# Patient Record
Sex: FEMALE | Race: WHITE | Employment: UNEMPLOYED | URBAN - METROPOLITAN AREA
[De-identification: names, ages, dates, MRNs, and addresses within clinical notes are randomized per-mention and may not be internally consistent; named-entity substitution may affect disease eponyms.]

---

## 2017-06-04 ENCOUNTER — HOSPITAL ENCOUNTER (EMERGENCY)
Facility: CLINIC | Age: 62
Discharge: HOME | End: 2017-06-04
Attending: FAMILY MEDICINE | Admitting: FAMILY MEDICINE
Payer: COMMERCIAL

## 2017-06-04 VITALS
DIASTOLIC BLOOD PRESSURE: 89 MMHG | TEMPERATURE: 97 F | RESPIRATION RATE: 18 BRPM | OXYGEN SATURATION: 99 % | HEART RATE: 75 BPM | SYSTOLIC BLOOD PRESSURE: 121 MMHG

## 2017-06-04 DIAGNOSIS — K04.7 DENTAL ABSCESS: ICD-10-CM

## 2017-06-04 PROCEDURE — 76536 US EXAM OF HEAD AND NECK: CPT

## 2017-06-04 PROCEDURE — 25000125 ZZHC RX 250: Performed by: FAMILY MEDICINE

## 2017-06-04 PROCEDURE — 25000132 ZZH RX MED GY IP 250 OP 250 PS 637: Performed by: FAMILY MEDICINE

## 2017-06-04 PROCEDURE — 41800 DRAINAGE OF GUM LESION: CPT | Performed by: FAMILY MEDICINE

## 2017-06-04 PROCEDURE — 99284 EMERGENCY DEPT VISIT MOD MDM: CPT | Mod: 25

## 2017-06-04 PROCEDURE — 76536 US EXAM OF HEAD AND NECK: CPT | Mod: 26 | Performed by: FAMILY MEDICINE

## 2017-06-04 PROCEDURE — 99284 EMERGENCY DEPT VISIT MOD MDM: CPT | Mod: 25 | Performed by: FAMILY MEDICINE

## 2017-06-04 PROCEDURE — 41800 DRAINAGE OF GUM LESION: CPT

## 2017-06-04 RX ORDER — BUPIVACAINE HYDROCHLORIDE AND EPINEPHRINE 2.5; 5 MG/ML; UG/ML
1 INJECTION, SOLUTION INFILTRATION; PERINEURAL ONCE
Status: COMPLETED | OUTPATIENT
Start: 2017-06-04 | End: 2017-06-04

## 2017-06-04 RX ORDER — CLINDAMYCIN HCL 150 MG
300 CAPSULE ORAL EVERY 6 HOURS
COMMUNITY
Start: 2017-06-03 | End: 2017-06-13

## 2017-06-04 RX ORDER — HYDROCODONE BITARTRATE AND ACETAMINOPHEN 5; 325 MG/1; MG/1
2 TABLET ORAL EVERY 6 HOURS PRN
COMMUNITY
End: 2019-09-15

## 2017-06-04 RX ADMIN — BUPIVACAINE HYDROCHLORIDE AND EPINEPHRINE BITARTRATE 1 ML: 2.5; .005 INJECTION, SOLUTION INFILTRATION; PERINEURAL at 21:37

## 2017-06-04 RX ADMIN — IBUPROFEN 600 MG: 400 TABLET ORAL at 21:36

## 2017-06-04 ASSESSMENT — ENCOUNTER SYMPTOMS
FEVER: 0
SINUS PRESSURE: 0
SHORTNESS OF BREATH: 0
CHILLS: 0
WHEEZING: 0
STRIDOR: 0
COUGH: 0
SORE THROAT: 0
FACIAL SWELLING: 1
RHINORRHEA: 0
DIAPHORESIS: 0
PALPITATIONS: 0

## 2017-06-04 NOTE — ED AVS SNAPSHOT
[facility] Emergency Department    [address] [address]    Phone:  [phone]    Fax:  [phone]                                       Zoe Alvarado   MRN: [id number]    Department:  [facility] Emergency Department   Date of Visit:  6/4/2017           Patient Information     Date Of Birth          1955        Your diagnoses for this visit were:     Dental abscess This was drained here. Stay on clindamycin.  stay hydrated.  return for worsening, especially swelling under the jaw line, increased redness extending over the face, shortness of breath, fever. recheck in dentistry tomorrow with Dr. Arteaga who will call you..       You were seen by Olman Yu MD.      Follow-up Information     Follow up with dentistry In 1 day.        Follow up with [facility] Emergency Department.    Specialty:  EMERGENCY MEDICINE    Why:  As needed, If symptoms worsen    Contact information:    [address] [place] [id number] [phone]    Additional information:    The medical center is located at   [address] [place] [address]. (between [address] and   [facility] in [place], [place] miles north   of [place]).        Discharge Instructions         ICD-10-CM    1. Dental abscess K04.7     This was drained here. Stay on clindamycin.  stay hydrated.  return for worsening, especially swelling under the jaw line, increased redness extending over the face, shortness of breath, fever. recheck in dentistry tomorrow with Dr. Arteaga who will call you..         Dental Abscess with Facial Cellulitis    A dental abscess is an infection at the base of a tooth. It means a pocket of pus has formed at the tip of a tooth root in your jaw bone. If the infection isn t treated, it can spread to the gum near the tooth. This causes swelling and pain. More serious infections spread to the face. This causes your face to swell (cellulitis). This is a very serious condition. Once the swelling begins, it can  "spread quickly.  A dental abscess usually starts with a crack or cavity in a tooth. The pain is often made worse by drinking hot or cold beverages, or biting on hard foods. The pain may spread from the tooth to your ear or the area of your jaw on the same side.  Home care  Follow these tips when caring for yourself at home:    Avoid hot and cold foods and drinks. Your tooth may be sensitive to changes in temperature. Don t chew on the side of the infected tooth.    If your tooth is chipped or cracked, or if there is a large open cavity, put oil of cloves directly on the tooth to relieve pain. You can buy oil of cloves at drugstores. Some pharmacies carry an over-the-counter \"toothache kit.\" This contains a paste that you can put on the exposed tooth to make it less sensitive.    Put a cold pack on your jaw over the sore area to help reduce pain.    You may use acetaminophen or ibuprofen to ease pain, unless another medicine was prescribed. Note: If you have chronic liver or kidney disease, talk with your health care provider before using these medicines. Also talk with your provider if you ve had a stomach ulcer or GI bleeding.     An antibiotic will be prescribed. Take it exactly as directed. Don t miss any doses.  Follow-up care  Follow up with your dentist or an oral surgeon as advised. Severe cases of cellulitis must be checked again within 24 hours. Once an infection occurs in a tooth, it will continue to be a problem until the infection is drained. This is done through surgery or a root canal. Or you may need to have your tooth pulled.  When to seek medical advice  Call your health care provider right away if any of these occur:    Swelling spreads to the upper half of your face or neck    You eyelids begin to swell shut    Pain gets worse or spreads to your neck    Fever of 100.4 F (38 C) or higher, or as directed by your health care provider    Unusual drowsiness    Headache or a stiff neck    Weakness or " fainting    Difficulty swallowing or breathing       6130-5362 [employer]. [address] [place] [address], [place] [address]. All rights reserved. This information is not intended as a substitute for professional medical care. Always follow your healthcare professional's instructions.          24 Hour Appointment Hotline       To make an appointment at any [facility], call [phone] ([phone]). If you don't have a family doctor or clinic, we will help you find one. [facility] are conveniently located to serve the needs of you and your family.             Review of your medicines      Our records show that you are taking the medicines listed below. If these are incorrect, please call your family doctor or clinic.        Dose / Directions Last dose taken    clindamycin 150 MG capsule   Commonly known as:  CLEOCIN   Dose:  300 mg        Take 300 mg by mouth every 6 hours   Refills:  0        HYDROcodone-acetaminophen 5-325 MG per tablet   Commonly known as:  NORCO   Dose:  2 tablet        Take 2 tablets by mouth every 6 hours as needed for moderate to severe pain   Refills:  0                Procedures and tests performed during your visit     POC US SOFT TISSUE      Orders Needing Specimen Collection     None      Pending Results     Date and Time Order Name Status Description    6/4/2017 2044 POC US SOFT TISSUE In process             Pending Culture Results     No orders found from 6/2/2017 to 6/5/2017.            Pending Results Instructions     If you had any lab results that were not finalized at the time of your Discharge, you can call the ED Lab Result RN at [phone]. You will be contacted by this team for any positive Lab results or changes in treatment. The nurses are available 7 days a week from 10A to 6:30P.  You can leave a message 24 hours per day and they will return your call.        Test Results From Your Hospital Stay        6/4/2017  8:44 PM      Result not yet  "available     Exam Begun                Thank you for choosing [facility]       Thank you for choosing [facility] for your care. Our goal is always to provide you with excellent care. Hearing back from our patients is one way we can continue to improve our services. Please take a few minutes to complete the written survey that you may receive in the mail after you visit with us. Thank you!        [employer] Information     [employer] lets you send messages to your doctor, view your test results, renew your prescriptions, schedule appointments and more. To sign up, go to [email or web address] . Click on \"Log in\" on the left side of the screen, which will take you to the Welcome page. Then click on \"Sign up Now\" on the right side of the page.     You will be asked to enter the access code listed below, as well as some personal information. Please follow the directions to create your username and password.     Your access code is: [email or web address]  Expires: 2017  9:28 PM     Your access code will  in 90 days. If you need help or a new code, please call your [facility] clinic or [phone].        Care EveryWhere ID     This is your Care EveryWhere ID. This could be used by other organizations to access your [facility] medical records  [id number]        After Visit Summary       This is your record. Keep this with you and show to your community pharmacist(s) and doctor(s) at your next visit.                  "

## 2017-06-04 NOTE — ED AVS SNAPSHOT
[facility] Emergency Department    [address] [facility] [facility] [address]    Phone:  [phone]    Fax:  [phone]                                       Zoe Alvarado   MRN: [id number]    Department:  [facility] Emergency Department   Date of Visit:  6/4/2017           After Visit Summary Signature Page     I have received my discharge instructions, and my questions have been answered. I have discussed any challenges I see with this plan with the nurse or doctor.    ..........................................................................................................................................  Patient/Patient Representative Signature      ..........................................................................................................................................  Patient Representative Print Name and Relationship to Patient    ..................................................               ................................................  Date                                            Time    ..........................................................................................................................................  Reviewed by Signature/Title    ...................................................              ..............................................  Date                                                            Time

## 2017-06-05 NOTE — DISCHARGE INSTRUCTIONS
"  ICD-10-CM    1. Dental abscess K04.7     This was drained here. Stay on clindamycin.  stay hydrated.  return for worsening, especially swelling under the jaw line, increased redness extending over the face, shortness of breath, fever. recheck in dentistry tomorrow with Dr. Arteaga who will call you..         Dental Abscess with Facial Cellulitis    A dental abscess is an infection at the base of a tooth. It means a pocket of pus has formed at the tip of a tooth root in your jaw bone. If the infection isn t treated, it can spread to the gum near the tooth. This causes swelling and pain. More serious infections spread to the face. This causes your face to swell (cellulitis). This is a very serious condition. Once the swelling begins, it can spread quickly.  A dental abscess usually starts with a crack or cavity in a tooth. The pain is often made worse by drinking hot or cold beverages, or biting on hard foods. The pain may spread from the tooth to your ear or the area of your jaw on the same side.  Home care  Follow these tips when caring for yourself at home:    Avoid hot and cold foods and drinks. Your tooth may be sensitive to changes in temperature. Don t chew on the side of the infected tooth.    If your tooth is chipped or cracked, or if there is a large open cavity, put oil of cloves directly on the tooth to relieve pain. You can buy oil of cloves at drugstores. Some pharmacies carry an over-the-counter \"toothache kit.\" This contains a paste that you can put on the exposed tooth to make it less sensitive.    Put a cold pack on your jaw over the sore area to help reduce pain.    You may use acetaminophen or ibuprofen to ease pain, unless another medicine was prescribed. Note: If you have chronic liver or kidney disease, talk with your health care provider before using these medicines. Also talk with your provider if you ve had a stomach ulcer or GI bleeding.     An antibiotic will be prescribed. Take it exactly " as directed. Don t miss any doses.  Follow-up care  Follow up with your dentist or an oral surgeon as advised. Severe cases of cellulitis must be checked again within 24 hours. Once an infection occurs in a tooth, it will continue to be a problem until the infection is drained. This is done through surgery or a root canal. Or you may need to have your tooth pulled.  When to seek medical advice  Call your health care provider right away if any of these occur:    Swelling spreads to the upper half of your face or neck    You eyelids begin to swell shut    Pain gets worse or spreads to your neck    Fever of 100.4 F (38 C) or higher, or as directed by your health care provider    Unusual drowsiness    Headache or a stiff neck    Weakness or fainting    Difficulty swallowing or breathing       3316-3624 [employer]. [address] [place] [address], [place] [address]. All rights reserved. This information is not intended as a substitute for professional medical care. Always follow your healthcare professional's instructions.

## 2017-06-05 NOTE — ED NOTES
Pt presents with c/o worsening dental abscess. Pt seen in UC yesterday, started on clinda, states is getting worse spoke with dentist who suggested re-seen. Pt c/o fever 100.5, no drainage. Pt taking vicodin for pain which is helping for pain. Spouse with pt.

## 2017-06-05 NOTE — ED PROVIDER NOTES
History     Chief Complaint   Patient presents with     Dental Problem     worsening abscess     HPI  Zoe Alvarado is a 62 year old female who presents with dental pain since tuesday - progressing with swelling onset saturday. attempted thursady eval at dental clinic but could not get in. seen in [facility] and failed aspiration but started on clinda. worsening in the last 24 hours.  No fever but tactile warmth.  no trisimus.  No dyspnea, no stridor    PMH  negative       Allergies   Allergen Reactions     Amoxicillin Rash     Current Outpatient Prescriptions   Medication Sig Dispense Refill     clindamycin (CLEOCIN) 150 MG capsule Take 300 mg by mouth every 6 hours       HYDROcodone-acetaminophen (NORCO) 5-325 MG per tablet Take 2 tablets by mouth every 6 hours as needed for moderate to severe pain             I have reviewed the Medications, Allergies, Past Medical and Surgical History, and Social History in the Epic system.    Review of Systems   Constitutional: Negative for chills, diaphoresis and fever.   HENT: Positive for facial swelling. Negative for ear pain, mouth sores, rhinorrhea, sinus pressure and sore throat.    Eyes: Negative for visual disturbance.   Respiratory: Negative for cough, shortness of breath, wheezing and stridor.    Cardiovascular: Negative for chest pain and palpitations.   Genitourinary: Negative for urgency.   Skin: Negative for rash.   All other systems reviewed and are negative.      Physical Exam   BP: 121/89  Pulse: 75  Temp: 97  F (36.1  C)  Resp: 18  SpO2: 99 %  Physical Exam   Constitutional: She appears distressed.   HENT:   Head:       Mouth/Throat: Oropharynx is clear and moist.   Pulmonary/Chest: No respiratory distress. She has no wheezes. She has no rales.   Skin: She is not diaphoretic.     oral cavity with third molar likely source of abscess. localized swelling at the base of this tooth in the buccal mucosa  No trismus.  No submandibular space swelling,  "erythema.      ED Course     ED Course     Incision + drainage  Performed by: OLMAN RAMOS  Authorized by: OLMAN RAMOS   Consent: Verbal consent obtained.  Risks and benefits: risks, benefits and alternatives were discussed  Consent given by: patient  Time out: Immediately prior to procedure a \"time out\" was called to verify the correct patient, procedure, equipment, support staff and site/side marked as required.  Type: abscess  Body area: mouth  Location details: alveolar process  Anesthesia: local infiltration    Anesthesia:  Anesthesia: local infiltration  Local Anesthetic: lidocaine 1% without epinephrine   Anesthetic total: 1 mL  Sedation:  Patient sedated: no    Incision depth: subcutaneous  Complexity: simple  Drainage: serosanguinous  Drainage amount: moderate  Wound treatment: wound left open  Packing material: none  Patient tolerance: Patient tolerated the procedure well with no immediate complications                Critical Care time:  none               Results for orders placed or performed during the hospital encounter of 06/04/17   POC US SOFT TISSUE    Impression    [facility] Procedure Note     Limited Bedside ED Ultrasound of Soft Tissue:    PROCEDURE: PERFORMED BY: Dr. Olman Ramos  INDICATIONS/SYMPTOM: Skin redness, evaluate for abscess, cellulitis or foreign body  PROBE: High frequency linear probe  BODY LOCATION: Soft tissue located on face     FINDINGS: Cobblestoning of soft tissue: absent   Hypoechoic fluid (ie abscess) identified: present measuring 3 cm   US utilized to access fluid pocket with needle  INTERPRETATION:  The soft tissue and muscle layers were evaluated.      Findings indicate abscess    IMAGE DOCUMENTATION: Images were archived to hard drive.           Assessments & Plan (with Medical Decision Making)     MDM: Zoe Alvarado is a 62 year old female Who presented with a dental abscess That it progressed over the last 24 hours despite starting clindamycin.  " This is been now only 24 hours of symptoms. We did discuss the need for a longer course before deciding about antibiotic failed.  I aspirated the fluctuance that was present With the number 18gauge needle was able to get a moderate amount of fluid out.  The seemed to improve her pain.  I can no longer palpate a fluctuance.     I spoke with her dentists group.  Dr. Arteaga was on call.  He will work her in for recheck tomorrow.    She may require parenteral antibiotics but did not require this this evening.  Recommended staying on the clindamycin for at least another 24 hours to decide if it was effective.  She is given strict precautions for signs of airway compromise, Isaac's  angina, worsening facial erythema swelling, and signs of cellulitis.    I have reviewed the nursing notes.    I have reviewed the findings, diagnosis, plan and need for follow up with the patient.    New Prescriptions    No medications on file       Final diagnoses:   Dental abscess - This was drained here. Stay on clindamycin.  stay hydrated.  return for worsening, especially swelling under the jaw line, increased redness extending over the face, shortness of breath, fever. recheck in dentistry tomorrow with Dr. Arteaga who will call you..       6/4/2017   [facility] EMERGENCY DEPARTMENT     Olman Yu MD  06/04/17 2129

## 2019-09-15 ENCOUNTER — HOSPITAL ENCOUNTER (EMERGENCY)
Facility: CLINIC | Age: 64
Discharge: HOME | End: 2019-09-15
Attending: PHYSICIAN ASSISTANT | Admitting: PHYSICIAN ASSISTANT
Payer: COMMERCIAL

## 2019-09-15 VITALS
OXYGEN SATURATION: 100 % | SYSTOLIC BLOOD PRESSURE: 123 MMHG | TEMPERATURE: 97.6 F | HEIGHT: 59 IN | BODY MASS INDEX: 21.17 KG/M2 | DIASTOLIC BLOOD PRESSURE: 71 MMHG | WEIGHT: 105 LBS | RESPIRATION RATE: 18 BRPM
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
## 2019-09-15 PROCEDURE — G0463 HOSPITAL OUTPT CLINIC VISIT: HCPCS | Performed by: PHYSICIAN ASSISTANT

## 2019-09-15 PROCEDURE — 99214 OFFICE O/P EST MOD 30 MIN: CPT | Mod: Z6 | Performed by: PHYSICIAN ASSISTANT

## 2019-09-15 RX ORDER — SERTRALINE HYDROCHLORIDE 100 MG/1
100 TABLET, FILM COATED ORAL
COMMUNITY
Start: 2013-02-16

## 2019-09-15 RX ORDER — CEPHALEXIN 500 MG/1
500 CAPSULE ORAL 4 TIMES DAILY
Qty: 28 CAPSULE | Refills: 0 | Status: SHIPPED | OUTPATIENT
Start: 2019-09-15 | End: 2019-09-22

## 2019-09-15 RX ORDER — PREDNISONE 20 MG/1
TABLET ORAL
Qty: 10 TABLET | Refills: 0 | Status: SHIPPED | OUTPATIENT
Start: 2019-09-15

## 2019-09-15 ASSESSMENT — MIFFLIN-ST. JEOR: SCORE: 931.91

## 2019-09-15 NOTE — ED PROVIDER NOTES
History     Chief Complaint   Patient presents with     Insect Bite     insect bite to right hand and now has red streak up right arm.  denies fevers.      HPI  Zoe Alvarado is a 64 year old female who presents to the urgent care with concern over insect bite to her right hand.  Patient reports that she was stung by what she believes was a wasp yesterday evening.  She complains of intense pruritus.  In the last 24 hours she has had increasing erythema, streaking spreading up her forearm and all the way up to her shoulder.  She denies any significant pain associated with this.  She has not had any other associated symptoms.  No recent fever, chills, myalgias, cough, dyspnea, wheezing nausea, vomiting, diarrhea or abdominal pain.  She reports that she was stung by 2 insects approximately 2 weeks ago on her left leg which had a significant swelling however not nearly as severe as her current complaints.  She has attempted to treat with both oral and topical benadryl without relief.        Allergies:  Allergies   Allergen Reactions     Amoxicillin Rash     Problem List:    There are no active problems to display for this patient.     Past Medical History:    No past medical history on file.    Past Surgical History:    No past surgical history on file.    Family History:    No family history on file.    Social History:  Marital Status:   [2]  Social History     Tobacco Use     Smoking status: Not on file   Substance Use Topics     Alcohol use: Not on file     Drug use: Not on file        Medications:      cephALEXin (KEFLEX) 500 MG capsule   predniSONE (DELTASONE) 20 MG tablet   sertraline (ZOLOFT) 100 MG tablet     Review of Systems  CONSTITUTIONAL:NEGATIVE for fever, chills, change in weight  INTEGUMENTARY/SKIN: POSITIVE for erythema right upper extremity NEGATIVE for ecchymosis, lacerations, abrasions   EYES: NEGATIVE for vision changes or irritation  ENT/MOUTH: NEGATIVE for ear, mouth and throat  "problems  RESP:NEGATIVE for significant cough or SOB  GI: NEGATIVE for nausea, vomiting, diarrhea, abdominal pain   MUSCULOSKELETAL: POSITIVE  for right hand swelling and NEGATIVE for other concerning arthralgias or myalgias   NEURO: NEGATIVE for numbness, weakness  Physical Exam   BP: 123/71  Heart Rate: 68  Temp: 97.6  F (36.4  C)  Resp: 18  Height: 149.9 cm (4' 11\")  Weight: 47.6 kg (105 lb)  SpO2: 100 %  Physical Exam  GENERAL APPEARANCE: healthy, alert and no distress  EYES: EOMI,  PERRL, conjunctiva clear  RESP: lungs clear to auscultation - no rales, rhonchi or wheezes  CV: regular rates and rhythm, normal S1 S2, no murmur noted  NEURO: Normal strength and tone, sensory exam grossly normal,  normal speech and mentation  SKIN: There is diffuse erythema patient's right hand, erythema along the volar surface of the right forearm is approximately 6 to 8 cm wide, similar linear erythema up the medial aspect of the right upper arm which is 1-2 cm wide  ED Course        Procedures        Critical Care time:  none          No results found for this or any previous visit (from the past 24 hour(s)).    Medications - No data to display    Assessments & Plan (with Medical Decision Making)     I have reviewed the nursing notes.    I have reviewed the findings, diagnosis, plan and need for follow up with the patient.       Discharge Medication List as of 9/15/2019  3:15 PM      START taking these medications    Details   cephALEXin (KEFLEX) 500 MG capsule Take 1 capsule (500 mg) by mouth 4 times daily for 7 days, Disp-28 capsule, R-0, E-Prescribe      predniSONE (DELTASONE) 20 MG tablet Take two tablets (= 40mg) each day for 5 (five) days, Disp-10 tablet, R-0, E-Prescribe           Final diagnoses:   Insect bite     64-year-old female presents to the urgent care with concern over increasing right upper extremity erythema after she sustained an insect bite yesterday.  She had stable vital signs upon arrival.  Physical exam " findings as described above were significant for diffuse erythema of her hand with linear erythema that narrows as it goes up to her right axilla.  I suspect symptoms are most consistent with localized significant histamine mediated reaction.  No symptoms of anaphylaxis.  I have low suspicion for cellulitis with lymphangitic streaking however cannot definitively rule that out at this time.  She is discharged home stable with prescription for prednisone to cover for allergic response and Keflex to cover for possible infection.  Continue OTC antihistamine, ice.  Follow-up if no improvement within the next 48 hours.  Worrisome reasons to return to the ER/UC sooner discussed.    Disclaimer: This note consists of symbols derived from keyboarding, dictation, and/or voice recognition software. As a result, there may be errors in the script that have gone undetected.  Please consider this when interpreting information found in the chart.      9/15/2019   [facility] EMERGENCY DEPARTMENT     Ewa Dias PA-C  09/15/19 1525

## 2019-09-15 NOTE — ED AVS SNAPSHOT
[facility] Emergency Department  [address] [facility] [address]  Phone:  [phone]  Fax:  [phone]                                    Zoe Alvarado   MRN: [id number]    Department:  [facility] Emergency Department   Date of Visit:  9/15/2019           After Visit Summary Signature Page    I have received my discharge instructions, and my questions have been answered. I have discussed any challenges I see with this plan with the nurse or doctor.    ..........................................................................................................................................  Patient/Patient Representative Signature      ..........................................................................................................................................  Patient Representative Print Name and Relationship to Patient    ..................................................               ................................................  Date                                   Time    ..........................................................................................................................................  Reviewed by Signature/Title    ...................................................              ..............................................  Date                                               Time          22EPIC Rev 08/18

## 2019-09-21 ENCOUNTER — HOSPITAL ENCOUNTER (EMERGENCY)
Facility: CLINIC | Age: 64
Discharge: HOME | End: 2019-09-21
Attending: EMERGENCY MEDICINE | Admitting: EMERGENCY MEDICINE
Payer: COMMERCIAL

## 2019-09-21 VITALS
TEMPERATURE: 97.8 F | OXYGEN SATURATION: 99 % | SYSTOLIC BLOOD PRESSURE: 103 MMHG | BODY MASS INDEX: 21.17 KG/M2 | WEIGHT: 105 LBS | DIASTOLIC BLOOD PRESSURE: 63 MMHG | HEART RATE: 59 BPM | HEIGHT: 59 IN | RESPIRATION RATE: 21 BRPM

## 2019-09-21 DIAGNOSIS — R42 LIGHTHEADEDNESS: ICD-10-CM

## 2019-09-21 DIAGNOSIS — E16.2 HYPOGLYCEMIA: ICD-10-CM

## 2019-09-21 DIAGNOSIS — R11.0 NAUSEA: ICD-10-CM

## 2019-09-21 LAB
ALBUMIN UR-MCNC: NEGATIVE MG/DL
ANION GAP SERPL CALCULATED.3IONS-SCNC: 7 MMOL/L (ref 3–14)
APPEARANCE UR: CLEAR
BASOPHILS # BLD AUTO: 0.1 10E9/L (ref 0–0.2)
BASOPHILS NFR BLD AUTO: 0.7 %
BILIRUB UR QL STRIP: NEGATIVE
BUN SERPL-MCNC: 40 MG/DL (ref 7–30)
CALCIUM SERPL-MCNC: 9.2 MG/DL (ref 8.5–10.1)
CHLORIDE SERPL-SCNC: 108 MMOL/L (ref 94–109)
CO2 SERPL-SCNC: 26 MMOL/L (ref 20–32)
COLOR UR AUTO: COLORLESS
CREAT SERPL-MCNC: 0.85 MG/DL (ref 0.52–1.04)
DIFFERENTIAL METHOD BLD: NORMAL
EOSINOPHIL # BLD AUTO: 0.1 10E9/L (ref 0–0.7)
EOSINOPHIL NFR BLD AUTO: 1.5 %
ERYTHROCYTE [DISTWIDTH] IN BLOOD BY AUTOMATED COUNT: 12.5 % (ref 10–15)
GFR SERPL CREATININE-BSD FRML MDRD: 72 ML/MIN/{1.73_M2}
GLUCOSE BLDC GLUCOMTR-MCNC: 86 MG/DL (ref 70–99)
GLUCOSE SERPL-MCNC: 92 MG/DL (ref 70–99)
GLUCOSE UR STRIP-MCNC: NEGATIVE MG/DL
HCT VFR BLD AUTO: 39.3 % (ref 35–47)
HGB BLD-MCNC: 13.1 G/DL (ref 11.7–15.7)
HGB UR QL STRIP: NEGATIVE
IMM GRANULOCYTES # BLD: 0.1 10E9/L (ref 0–0.4)
IMM GRANULOCYTES NFR BLD: 0.7 %
KETONES UR STRIP-MCNC: NEGATIVE MG/DL
LEUKOCYTE ESTERASE UR QL STRIP: NEGATIVE
LYMPHOCYTES # BLD AUTO: 2 10E9/L (ref 0.8–5.3)
LYMPHOCYTES NFR BLD AUTO: 26.6 %
MCH RBC QN AUTO: 32.4 PG (ref 26.5–33)
MCHC RBC AUTO-ENTMCNC: 33.3 G/DL (ref 31.5–36.5)
MCV RBC AUTO: 97 FL (ref 78–100)
MONOCYTES # BLD AUTO: 0.6 10E9/L (ref 0–1.3)
MONOCYTES NFR BLD AUTO: 7.9 %
NEUTROPHILS # BLD AUTO: 4.7 10E9/L (ref 1.6–8.3)
NEUTROPHILS NFR BLD AUTO: 62.6 %
NITRATE UR QL: NEGATIVE
NRBC # BLD AUTO: 0 10*3/UL
NRBC BLD AUTO-RTO: 0 /100
PH UR STRIP: 9 PH (ref 5–7)
PLATELET # BLD AUTO: 200 10E9/L (ref 150–450)
POTASSIUM SERPL-SCNC: 3.6 MMOL/L (ref 3.4–5.3)
RBC # BLD AUTO: 4.04 10E12/L (ref 3.8–5.2)
SODIUM SERPL-SCNC: 141 MMOL/L (ref 133–144)
SOURCE: ABNORMAL
SP GR UR STRIP: 1 (ref 1–1.03)
TROPONIN I SERPL-MCNC: <0.015 UG/L (ref 0–0.04)
UROBILINOGEN UR STRIP-MCNC: 0 MG/DL (ref 0–2)
WBC # BLD AUTO: 7.5 10E9/L (ref 4–11)

## 2019-09-21 PROCEDURE — 81001 URINALYSIS AUTO W/SCOPE: CPT | Performed by: EMERGENCY MEDICINE

## 2019-09-21 PROCEDURE — 93005 ELECTROCARDIOGRAM TRACING: CPT | Mod: 76 | Performed by: EMERGENCY MEDICINE

## 2019-09-21 PROCEDURE — 80048 BASIC METABOLIC PNL TOTAL CA: CPT | Performed by: EMERGENCY MEDICINE

## 2019-09-21 PROCEDURE — 00000146 ZZHCL STATISTIC GLUCOSE BY METER IP

## 2019-09-21 PROCEDURE — 99284 EMERGENCY DEPT VISIT MOD MDM: CPT | Performed by: EMERGENCY MEDICINE

## 2019-09-21 PROCEDURE — 93010 ELECTROCARDIOGRAM REPORT: CPT | Mod: Z6 | Performed by: EMERGENCY MEDICINE

## 2019-09-21 PROCEDURE — 85025 COMPLETE CBC W/AUTO DIFF WBC: CPT | Performed by: EMERGENCY MEDICINE

## 2019-09-21 PROCEDURE — 84484 ASSAY OF TROPONIN QUANT: CPT | Performed by: EMERGENCY MEDICINE

## 2019-09-21 PROCEDURE — 99284 EMERGENCY DEPT VISIT MOD MDM: CPT | Mod: Z6 | Performed by: EMERGENCY MEDICINE

## 2019-09-21 PROCEDURE — 93005 ELECTROCARDIOGRAM TRACING: CPT | Performed by: EMERGENCY MEDICINE

## 2019-09-21 PROCEDURE — 93010 ELECTROCARDIOGRAM REPORT: CPT | Mod: 76 | Performed by: EMERGENCY MEDICINE

## 2019-09-21 ASSESSMENT — MIFFLIN-ST. JEOR: SCORE: 931.91

## 2019-09-21 NOTE — ED AVS SNAPSHOT
[facility] Emergency Department  [address] [facility] [address]  Phone:  [phone]  Fax:  [phone]                                    Zoe Alvarado   MRN: [id number]    Department:  [facility] Emergency Department   Date of Visit:  9/21/2019           After Visit Summary Signature Page    I have received my discharge instructions, and my questions have been answered. I have discussed any challenges I see with this plan with the nurse or doctor.    ..........................................................................................................................................  Patient/Patient Representative Signature      ..........................................................................................................................................  Patient Representative Print Name and Relationship to Patient    ..................................................               ................................................  Date                                   Time    ..........................................................................................................................................  Reviewed by Signature/Title    ...................................................              ..............................................  Date                                               Time          22EPIC Rev 08/18

## 2019-09-21 NOTE — ED PROVIDER NOTES
History     Chief Complaint   Patient presents with     Dizziness     dizzy and lightheaded while working out at the gym. BG 62 EMS gave oral glucose and BG is 80. BP 92/55, no cardica hx, no CP.     HPI  Zoe Alvarado is a 64 year old female with history of depression, on Zoloft, who became lightheaded, nauseated, and diaphoretic after working out at the gym this morning.  Blood glucose for EMS was 62 she was given oral glucose and her symptoms resolved by the time she arrived emergency department.  She reports that it is a kickboxing type workout and last hour and 15 minutes.  She has been doing this for quite some time and recently finished her work-up.  She did not have any breakfast this morning.  Denies any history of diabetes mellitus.  She did not have any chest pain or shortness of breath.  She was in her usual state of health prior to and during the work-up.  She does not relate any recent fever, chills, nausea, vomiting, sore throat, rash, dysuria, abdominal pain.  She has been taking prednisone and cephalexin for bee sting to the hand.  Social alcohol use.  Non-smoker    The patient's PMHx, Surgical Hx, Allergies, and Medications were all reviewed with the patient.    Allergies:  Allergies   Allergen Reactions     Amoxicillin Rash       Problem List:    There are no active problems to display for this patient.       Past Medical History:    No past medical history on file.    Past Surgical History:    No past surgical history on file.    Family History:    No family history on file.    Social History:  Marital Status:   [2]  Social History     Tobacco Use     Smoking status: Not on file   Substance Use Topics     Alcohol use: Not on file     Drug use: Not on file        Medications:      cephALEXin (KEFLEX) 500 MG capsule   predniSONE (DELTASONE) 20 MG tablet   sertraline (ZOLOFT) 100 MG tablet         Review of Systems  A 10 point review of systems performed and is otherwise negative  "except as noted in the HPI.    Physical Exam   BP: 96/68  Pulse: 73  Heart Rate: 72  Temp: 97.8  F (36.6  C)  Resp: 14  Height: 149.9 cm (4' 11\")  Weight: 47.6 kg (105 lb)  SpO2: 100 %    Physical Exam  GEN: Awake, alert, and cooperative. No acute distress  HENT: MMM. External ears and nose normal bilaterally.  Atraumatic  EYES: EOM intact. Conjunctiva clear. PEERL. No discharge.   NECK: Supple, symmetric.  CV :Regular rate and rhythm.  No murmurs appreciated  PULM: Normal effort. No wheezes, rales, or rhonchi bilaterally.  ABD: Soft, non-tender, non-distended. No rebound or guarding.   NEURO: Normal speech. Following commands. CN II-XII grossly intact. Patient answering questions and interacting appropriately.   EXT: No gross deformity. Warm and well perfused.  No lower extremity edema  INT: Warm. No diaphoresis. Normal color.     ED Course        Procedures             EKG Interpretation:      Interpreted by Jaylen Stanton MD  Time reviewed: 0930  Symptoms at time of EKG: None   Rhythm: normal sinus   Rate: Normal  Axis: Normal  Ectopy: none  Conduction: normal  ST Segments/ T Waves: Non-specific ST-T wave changes  Q Waves: none  Comparison to prior: convex ST segment inV3 changed from prior    Clinical Impression: non-specific EKG         EKG Interpretation:      Interpreted by Jaylen Stanton MD  Time reviewed:1325   Symptoms at time of EKG: None   Rhythm: Normal sinus   Rate: Normal  Axis: Normal  Ectopy: None  Conduction: Normal  ST Segments/ T Waves: No acute ischemic changes and Non-specific ST-T wave changes  Q Waves: None  Comparison to prior: Unchanged from 0918 on 9/21/2019    Clinical Impression: convex ST segment inV3 changed from prior        Critical Care time:  none               Results for orders placed or performed during the hospital encounter of 09/21/19 (from the past 24 hour(s))   CBC with platelets differential   Result Value Ref Range    WBC 7.5 4.0 - 11.0 10e9/L    RBC Count " 4.04 3.8 - 5.2 10e12/L    Hemoglobin 13.1 11.7 - 15.7 g/dL    Hematocrit 39.3 35.0 - 47.0 %    MCV 97 78 - 100 fl    MCH 32.4 26.5 - 33.0 pg    MCHC 33.3 31.5 - 36.5 g/dL    RDW 12.5 10.0 - 15.0 %    Platelet Count 200 150 - 450 10e9/L    Diff Method Automated Method     % Neutrophils 62.6 %    % Lymphocytes 26.6 %    % Monocytes 7.9 %    % Eosinophils 1.5 %    % Basophils 0.7 %    % Immature Granulocytes 0.7 %    Nucleated RBCs 0 0 /100    Absolute Neutrophil 4.7 1.6 - 8.3 10e9/L    Absolute Lymphocytes 2.0 0.8 - 5.3 10e9/L    Absolute Monocytes 0.6 0.0 - 1.3 10e9/L    Absolute Eosinophils 0.1 0.0 - 0.7 10e9/L    Absolute Basophils 0.1 0.0 - 0.2 10e9/L    Abs Immature Granulocytes 0.1 0 - 0.4 10e9/L    Absolute Nucleated RBC 0.0    Basic metabolic panel   Result Value Ref Range    Sodium 141 133 - 144 mmol/L    Potassium 3.6 3.4 - 5.3 mmol/L    Chloride 108 94 - 109 mmol/L    Carbon Dioxide 26 20 - 32 mmol/L    Anion Gap 7 3 - 14 mmol/L    Glucose 92 70 - 99 mg/dL    Urea Nitrogen 40 (H) 7 - 30 mg/dL    Creatinine 0.85 0.52 - 1.04 mg/dL    GFR Estimate 72 >60 mL/min/[1.73_m2]    GFR Estimate If Black 83 >60 mL/min/[1.73_m2]    Calcium 9.2 8.5 - 10.1 mg/dL   Troponin I   Result Value Ref Range    Troponin I ES <0.015 0.000 - 0.045 ug/L   UA reflex to Microscopic and Culture   Result Value Ref Range    Color Urine Colorless     Appearance Urine Clear     Glucose Urine Negative NEG^Negative mg/dL    Bilirubin Urine Negative NEG^Negative    Ketones Urine Negative NEG^Negative mg/dL    Specific Gravity Urine 1.004 1.003 - 1.035    Blood Urine Negative NEG^Negative    pH Urine 9.0 (H) 5.0 - 7.0 pH    Protein Albumin Urine Negative NEG^Negative mg/dL    Urobilinogen mg/dL 0.0 0.0 - 2.0 mg/dL    Nitrite Urine Negative NEG^Negative    Leukocyte Esterase Urine Negative NEG^Negative    Source Midstream Urine    Glucose by meter   Result Value Ref Range    Glucose 86 70 - 99 mg/dL       Medications - No data to  display    Assessments & Plan (with Medical Decision Making)   64-year-old female with history of depression who had an episode of lightheadedness, diaphoresis, nausea in the setting of hypoglycemia at the end of an hour plus workout at the gym.  She was given oral glucose solution by EMS and symptoms resolved prior to arrival to emergency department.  On arrival her vital signs were within normal limits and she was well-appearing.  Exam nonfocal and reassuring.  ECG obtained and no acute ischemic changes however she does have convex ST segment in V3 which is changed from prior.  She does not have any chest pain or shortness of breath so my initial suspicion for ischemia is low.  Repeat EKG unchanged from prior, troponin below detection, no chest pain or shortness of breath, I very low suspicion for ACS.  No infectious symptoms on exam, afebrile, was feeling well enough to participate in 75-minute intense workout, no leukocytosis, I have very low suspicion for sepsis or infectious etiology.  No known liver disease, no stigmata of cirrhosis.  Urinalysis without evidence of acute infection.  Patient reports that she had nothing to eat since early afternoon yesterday and skipped breakfast today.  I feels most likely that her hypoglycemia was caused by intense physical activity in the setting of decreased p.o. Nutrition.  Repeat blood sugar 86 plan for her to contact her primary care's office on Monday morning to set up follow-up visit for this coming week.  Strict ED return precautions discussed.  Patient expresses agreement understanding of plan.  Discharged to home in stable condition.      I have reviewed the nursing notes.    I have reviewed the findings, diagnosis, plan and need for follow up with the patient.       New Prescriptions    No medications on file       Final diagnoses:   Hypoglycemia   Lightheadedness   Nausea     Jaylen Stanton MD    9/21/2019   [facility] EMERGENCY DEPARTMENT    Disclaimer:  This note consists of words and symbols derived from keyboarding and dictation using voice recognition software.  As a result, there may be errors that have gone undetected.  Please consider this when interpreting information found in this note.     Jaylen Stanton MD  09/21/19 [id number]

## 2019-09-21 NOTE — DISCHARGE INSTRUCTIONS
Please schedule an appointment with your primary care provider this coming week for follow-up care.  Be sure to eat prior to workouts.  If you feel lightheaded, nauseated, break out in a cold sweat, heart palpitations, or confusion, please return to emergency department immediately for further evaluation and treatment.

## 2019-09-21 NOTE — ED TRIAGE NOTES
Pt finished working out @ 0715 today felt lightheaded, dizzy, cold and pale. Initial Blood glucose= 62, oral glucose given per EMS then glucose into the 80's. Hx of treated with bee sting last Sunday in ED.